# Patient Record
Sex: MALE | Race: WHITE | Employment: FULL TIME | URBAN - METROPOLITAN AREA
[De-identification: names, ages, dates, MRNs, and addresses within clinical notes are randomized per-mention and may not be internally consistent; named-entity substitution may affect disease eponyms.]

---

## 2017-01-25 ENCOUNTER — OFFICE VISIT (OUTPATIENT)
Dept: UROLOGY | Facility: CLINIC | Age: 37
End: 2017-01-25
Payer: COMMERCIAL

## 2017-01-25 VITALS — HEART RATE: 76 BPM | SYSTOLIC BLOOD PRESSURE: 123 MMHG | DIASTOLIC BLOOD PRESSURE: 76 MMHG

## 2017-01-25 DIAGNOSIS — N44.8 ACQUIRED ASYMMETRY IN TESTICULAR SIZE: Primary | ICD-10-CM

## 2017-01-25 PROCEDURE — 99203 OFFICE O/P NEW LOW 30 MIN: CPT | Performed by: UROLOGY

## 2017-01-25 ASSESSMENT — PAIN SCALES - GENERAL: PAINLEVEL: NO PAIN (0)

## 2017-01-25 NOTE — MR AVS SNAPSHOT
After Visit Summary   1/25/2017    Jacob Nails    MRN: [id number]           Patient Information     Date Of Birth          1980        Visit Information        Provider Department      1/25/2017 10:30 AM Saad Bowie MD [facility]        Today's Diagnoses     Acquired asymmetry in testicular size    -  1        Follow-ups after your visit        Your next 10 appointments already scheduled     Jan 26, 2017 11:20 AM   US TESTICULAR AND SCROTUM with MGUS1, MG US TECH   [facility] ([facility])    [id number] [place] [address] [phone]           Please bring a list of your medicines (including vitamins, minerals and over-the-counter drugs). Also, tell your doctor about any allergies you may have. Wear comfortable clothes and leave your valuables at home.  You do not need to do anything special to prepare for your exam.  Please call the Imaging Department at your exam site with any questions.              Future tests that were ordered for you today     Open Future Orders        Priority Expected Expires Ordered    US Testicular and Scrotum Routine  1/25/2018 1/25/2017            Who to contact     If you have questions or need follow up information about today's clinic visit or your schedule please contact [facility] directly at [phone].  Normal or non-critical lab and imaging results will be communicated to you by MyChart, letter or phone within 4 business days after the clinic has received the results. If you do not hear from us within 7 days, please contact the clinic through MyChart or phone. If you have a critical or abnormal lab result, we will notify you by phone as soon as possible.  Submit refill requests through [employer] or call your pharmacy and they will forward the refill request to us. Please allow 3 business days for your refill to be completed.          Additional  Information About Your Visit        [employer] Information     [employer] gives you secure access to your electronic health record. If you see a primary care provider, you can also send messages to your care team and make appointments. If you have questions, please call your primary care clinic.  If you do not have a primary care provider, please call [phone] and they will assist you.      [employer] is an electronic gateway that provides easy, online access to your medical records. With [employer], you can request a clinic appointment, read your test results, renew a prescription or communicate with your care team.     To access your existing account, please contact your [facility] or call [phone] for assistance.        Care EveryWhere ID     This is your Care EveryWhere ID. This could be used by other organizations to access your [place] medical records  [id number]        Your Vitals Were     Pulse                   76            Blood Pressure from Last 3 Encounters:   01/25/17 123/76   12/30/16 134/73   08/17/16 138/86    Weight from Last 3 Encounters:   12/30/16 73.029 kg (161 lb)   08/17/16 81.647 kg (180 lb)   12/31/15 87.998 kg (194 lb)               Primary Care Provider Office Phone # Fax #    [facility] [place] [phone]       No address on file        Thank you!     Thank you for choosing [facility]  for your care. Our goal is always to provide you with excellent care. Hearing back from our patients is one way we can continue to improve our services. Please take a few minutes to complete the written survey that you may receive in the mail after your visit with us. Thank you!             Your Updated Medication List - Protect others around you: Learn how to safely use, store and throw away your medicines at www.disposemymeds.org.      Notice  As of 1/25/2017 11:06 AM    You have not been prescribed any medications.

## 2017-01-25 NOTE — PROGRESS NOTES
I am seeing Jacob Nails in consultation from Rick Diaz  for evaluation of retractile right testis.    HPI:  Jacob Nails is a 36 year old male who notes ascension of the right testis with intercourse.  This is painful for him.  At rest testis sits in the lower scrotum, however.  No history of torsion.  The testis has always come down on its own.    REVIEW OF SYSTEMS:  General: negative  Skin: negative  Eyes: negative  Ears/Nose/Throat: negative  Respiratory: negative  Cardiovascular: negative  Gastrointestinal: negative  Genitourinary: see HPI  Musculoskeletal: negative  Neurologic: negative  Psychiatric: negative  Hematologic/Lymphatic/Immunologic: negative  Endocrine: negative    PAST MEDICAL HX:  No chronic medical problems     PAST SURG HX:  Shoulder surgery right shoulder fracture 1 year ago.    FAMILY HX:  Family History   Problem Relation Age of Onset     DIABETES Paternal Grandfather      CANCER Paternal Aunt      Hypertension No family hx of      CEREBROVASCULAR DISEASE No family hx of      Thyroid Disease No family hx of      Glaucoma No family hx of      Macular Degeneration No family hx of        SOCIAL HX:  Social History   Substance Use Topics     Smoking status: Never Smoker      Smokeless tobacco: Never Used      Comment: chew once per month     Alcohol Use: Yes      Comment: a few times per month       MEDICATIONS:  No prescription medications     ALLERGIES:  Milk protein extract and Wheat extract      GENERAL PHYSICAL EXAM:     /76 mmHg  Pulse 76   Constitutional: No acute distress. Well nourished.   PSYCH: normal mood and affect.  NEURO: normal gait, no focal deficits.   EYES: anicteric, EOMI, PERR.  CARDIOPULMONARY: breathing non-labored, pulse regular rate/rhythm, no peripheral edema.  GI: Abdomen soft, non-tender, no surgical scars, no organomegaly.  MUSCULOSKELETAL: normal limb proportions, no muscle wasting, no contractures.  SKIN: Normal virilized hair  distribution, no lesions, warts or rashes over genitalia, abdomen extremities or face.  HEME/LYMPH: no ecchymosis, no lymphadenopathy in groin or neck, no lymphedema.     EXAM:  Phallus normal , meatus adequate, no plaques palpated.   Left testis descended , size is normal  , consistency is normal . No intra-testicular masses.   Right testis descended , size is normal  , consistency is normal . No intra-testicular masses.   Epididymes present, non-tender, not-enlarged.   Left cord: Vas present. no varicocele noted.  Right cord: Vas present. no varicocele noted.     Prostate exam: not indicated.    Imaging/labs:  CR     0.84   1/20/2012    ASSESSMENT:     Retractile right testis.    PLAN:    Scrotal ultrasound to exclude testis pathology.    Discussed with him we could consider lysis of cremaster muscle on the right spermatic cord,  if problem bothersome enough over time. This would be a very easy case with minimal recovery.  The indication for this type of procedure would be when the pain is bothersome enough to have surgery.    PRN follow-up     Copied cc to Consulting provider Rick Diaz        Thank-you for the kind consultation.  Saad Bowie MD     Urological Surgeon    Scrotal ultrasound done 1/26/17  Impression:   1.  Right testicular microlithiasis.  No intratesticular mass.  In the  absence of risk factors, this alone is not an indication for  additional follow-up with ultrasound.   2.  Left epididymal head cyst versus spermatocele.

## 2017-01-25 NOTE — NURSING NOTE
"Jacob Nails's goals for this visit include:   Chief Complaint   Patient presents with     Consult     testicular pain - right      He requests these members of his care team be copied on today's visit information: YES -    Referring Provider:  Rick Diaz PA-C  [facility]  [address] [place] [address]    Initial /76 mmHg  Pulse 76 Estimated body mass index is 20.95 kg/(m^2) as calculated from the following:    Height as of 12/30/16: 1.867 m (6' 1.5\").    Weight as of 12/30/16: 73.029 kg (161 lb).  BP completed using cuff size: large  "

## 2017-01-26 ENCOUNTER — RADIANT APPOINTMENT (OUTPATIENT)
Dept: ULTRASOUND IMAGING | Facility: CLINIC | Age: 37
End: 2017-01-26
Attending: UROLOGY
Payer: COMMERCIAL

## 2017-01-26 DIAGNOSIS — N44.8 ACQUIRED ASYMMETRY IN TESTICULAR SIZE: ICD-10-CM

## 2017-01-26 PROCEDURE — 76870 US EXAM SCROTUM: CPT

## 2017-01-26 NOTE — PROGRESS NOTES
Quick Note:    Dear Jacob,   Here are your recent results.     Your right testis showed some microlithiasis- (tiny stones/calcifications) likely due to history of injury/trauma from the right testis being retractile. There may be a slightly increased risk of testis cancer in men with this finding but testicular self exam monthly is all that would be advised at this time.    There is no evidence of malignancy, good news.    Thank You  Let me know if you have any questions.      Saad Bowie MD  ______

## 2017-03-01 ENCOUNTER — OFFICE VISIT (OUTPATIENT)
Dept: UROLOGY | Facility: CLINIC | Age: 37
End: 2017-03-01
Payer: COMMERCIAL

## 2017-03-01 VITALS — DIASTOLIC BLOOD PRESSURE: 77 MMHG | SYSTOLIC BLOOD PRESSURE: 130 MMHG | HEART RATE: 66 BPM

## 2017-03-01 DIAGNOSIS — R39.198 DECREASED URINE STREAM: Primary | ICD-10-CM

## 2017-03-01 PROCEDURE — 52000 CYSTOURETHROSCOPY: CPT | Performed by: UROLOGY

## 2017-03-01 PROCEDURE — 99213 OFFICE O/P EST LOW 20 MIN: CPT | Mod: 25 | Performed by: UROLOGY

## 2017-03-01 RX ORDER — TAMSULOSIN HYDROCHLORIDE 0.4 MG/1
0.4 CAPSULE ORAL DAILY
Qty: 90 CAPSULE | Refills: 3 | Status: SHIPPED | OUTPATIENT
Start: 2017-03-01 | End: 2017-10-30

## 2017-03-01 ASSESSMENT — PAIN SCALES - GENERAL: PAINLEVEL: NO PAIN (0)

## 2017-03-01 NOTE — MR AVS SNAPSHOT
After Visit Summary   3/1/2017    Jacob Nails    MRN: [id number]           Patient Information     Date Of Birth          1980        Visit Information        Provider Department      3/1/2017 10:00 AM Saad Bowie MD [facility]        Care Instructions    After Your Cystoscopy    What happens after the exam?    You may go back to your normal diet and activity as you feel ready, unless your doctor tells you not to.    For the next two days, you may notice:    Some blood in your urine  Some burning when you urinate (use the toilet)  An urge to urinate more often  Bladder spasms    These are normal after the procedure and should go away after a day or two.  To relieve these problems drink 6 to 8 large glasses of water each day (includes drinks at meals) as this will help clear the urine.  Take warm baths to relieve pain and bladder spasms.  Do not add anything to the bath water.  You may also take Tylenol (acetaminophen) for pain if needed.    When should I call my doctor?    A fever over 100F (38C) for more than a day. (Before you call the doctor, check your temperature under your tongue)  Chills  Failure to urinate: No urine comes out when you try to use the toilet. (Try soaking in a bathtub full of warm water. If still no urine, call your doctor)  A lot of blood in the urine, or blood clots larger than a nickel  Pain in the back or belly area (abdomen)  Pain or spasms that are not relieved by warm tub baths and pain medicine  Severe pain, burning or other problems while passing urine  Pain that gets worse after two days                    Follow-ups after your visit        Your next 10 appointments already scheduled     Apr 05, 2017 10:00 AM CDT   Return Visit with Saad Bowie MD   [facility] ([facility]    [address] [place] [address] [phone]              Who to contact     If you have  questions or need follow up information about today's clinic visit or your schedule please contact [facility] directly at [phone].  Normal or non-critical lab and imaging results will be communicated to you by [employer], letter or phone within 4 business days after the clinic has received the results. If you do not hear from us within 7 days, please contact the clinic through [employer] or phone. If you have a critical or abnormal lab result, we will notify you by phone as soon as possible.  Submit refill requests through [employer] or call your pharmacy and they will forward the refill request to us. Please allow 3 business days for your refill to be completed.          Additional Information About Your Visit        [employer] Information     [employer] gives you secure access to your electronic health record. If you see a primary care provider, you can also send messages to your care team and make appointments. If you have questions, please call your primary care clinic.  If you do not have a primary care provider, please call [phone] and they will assist you.      [employer] is an electronic gateway that provides easy, online access to your medical records. With [employer], you can request a clinic appointment, read your test results, renew a prescription or communicate with your care team.     To access your existing account, please contact your [facility] or call [phone] for assistance.        Care EveryWhere ID     This is your Care EveryWhere ID. This could be used by other organizations to access your [place] medical records  [id number]        Your Vitals Were     Pulse                   66            Blood Pressure from Last 3 Encounters:   03/01/17 130/77   01/25/17 123/76   12/30/16 134/73    Weight from Last 3 Encounters:   12/30/16 73 kg (161 lb)   08/17/16 81.6 kg (180 lb)   12/31/15 88 kg (194 lb)              Today, you had the following     No orders  found for display       Primary Care Provider Office Phone # Fax #    Zenaida Morales [place] [phone]       No address on file        Thank you!     Thank you for choosing [facility]  for your care. Our goal is always to provide you with excellent care. Hearing back from our patients is one way we can continue to improve our services. Please take a few minutes to complete the written survey that you may receive in the mail after your visit with us. Thank you!             Your Updated Medication List - Protect others around you: Learn how to safely use, store and throw away your medicines at www.disposemymeds.org.      Notice  As of 3/1/2017 11:26 AM    You have not been prescribed any medications.

## 2017-03-01 NOTE — PROGRESS NOTES
Jacob Nails is a 36 year old male known to urology.  History of retractile testis that is not bothering him much now.    Noting that he has a weaker force of stream.  Sometimes can be strong.    Here for workup.    Uroflow March 1, 2017:  , Qmax 15.1, RU 38. Curve: flat bell.    Pre-procedure diagnosis:  Slow flow urine  Post-procedure diagnosis: Normal cystoscopy  Procedure performed:  Cystourethroscopy  Surgeon:    David HERNANDEZ   Anesthesia:    Local    Indications for procedure:   Jacob Nails is a 36 year old male with a history of slow urine flow.    Description of procedure:   After fully informed, voluntary consent was obtained, the patient was brought into the procedure room, identified and placed in a supine position on the cysto table.  The groin/scrotum/vagina/introitus were prepped and draped in a sterile fashion with betadine.  A 15F flexible cystoscope was inserted into the urethra and the bladder and urethra examined in a systematic manner.  The patient tolerated the procedure well and there were no complications.      Cystoscopic findings:  The urethra was normal without strictures.  The prostate was 2.5cm long and channel appeared open. The bladder neck was not narrowed but was perhaps a little narrow.  There was no median lobe.  The bladder was completely surveyed.  There was no trabeculation.  There were no tumors, stones, or diverticula identifed.  The ureteric orifices were normal in position and number and effluxing clear urine.    Assessment/Plan:   Jacob Nails is a 36 year old male with a history of slow flow, now with pretty normal findings on cystoscopy.  Differential diagnosis is Bruno's syndrome versus primary bladder neck dysfunction.  No suspicion of weak detrusor.    Will try Flomax.  Discussed side effects and how to take.  If flow / symptoms not improved on Flomax, then schedule urodynamic studies.  Return to clinic 6 weeks  15min visit, over 50%  face to face in counseling/discussion of above voiding issues.    David HERNANDEZ

## 2017-03-01 NOTE — NURSING NOTE
"Jacob Nails's goals for this visit include:   Chief Complaint   Patient presents with     RECHECK     He requests these members of his care team be copied on today's visit information: NONE    Initial /77  Pulse 66 Estimated body mass index is 20.95 kg/(m^2) as calculated from the following:    Height as of 12/30/16: 1.867 m (6' 1.5\").    Weight as of 12/30/16: 73 kg (161 lb).  BP completed using cuff size: regular    "

## 2017-03-01 NOTE — PATIENT INSTRUCTIONS
After Your Cystoscopy    What happens after the exam?    You may go back to your normal diet and activity as you feel ready, unless your doctor tells you not to.    For the next two days, you may notice:    Some blood in your urine  Some burning when you urinate (use the toilet)  An urge to urinate more often  Bladder spasms    These are normal after the procedure and should go away after a day or two.  To relieve these problems drink 6 to 8 large glasses of water each day (includes drinks at meals) as this will help clear the urine.  Take warm baths to relieve pain and bladder spasms.  Do not add anything to the bath water.  You may also take Tylenol (acetaminophen) for pain if needed.    When should I call my doctor?    A fever over 100F (38C) for more than a day. (Before you call the doctor, check your temperature under your tongue)  Chills  Failure to urinate: No urine comes out when you try to use the toilet. (Try soaking in a bathtub full of warm water. If still no urine, call your doctor)  A lot of blood in the urine, or blood clots larger than a nickel  Pain in the back or belly area (abdomen)  Pain or spasms that are not relieved by warm tub baths and pain medicine  Severe pain, burning or other problems while passing urine  Pain that gets worse after two days

## 2017-09-29 ENCOUNTER — TRANSFERRED RECORDS (OUTPATIENT)
Dept: PHYSICAL THERAPY | Facility: CLINIC | Age: 37
End: 2017-09-29

## 2017-10-30 ENCOUNTER — OFFICE VISIT (OUTPATIENT)
Dept: FAMILY MEDICINE | Facility: CLINIC | Age: 37
End: 2017-10-30
Payer: COMMERCIAL

## 2017-10-30 ENCOUNTER — TELEPHONE (OUTPATIENT)
Dept: FAMILY MEDICINE | Facility: CLINIC | Age: 37
End: 2017-10-30

## 2017-10-30 VITALS
DIASTOLIC BLOOD PRESSURE: 66 MMHG | HEART RATE: 61 BPM | BODY MASS INDEX: 19.91 KG/M2 | WEIGHT: 153 LBS | OXYGEN SATURATION: 100 % | SYSTOLIC BLOOD PRESSURE: 120 MMHG | TEMPERATURE: 97.6 F

## 2017-10-30 DIAGNOSIS — J06.9 UPPER RESPIRATORY TRACT INFECTION, UNSPECIFIED TYPE: ICD-10-CM

## 2017-10-30 DIAGNOSIS — J30.2 SEASONAL ALLERGIC RHINITIS, UNSPECIFIED CHRONICITY, UNSPECIFIED TRIGGER: Primary | ICD-10-CM

## 2017-10-30 PROCEDURE — 99213 OFFICE O/P EST LOW 20 MIN: CPT | Performed by: HOSPITALIST

## 2017-10-30 RX ORDER — ALBUTEROL SULFATE 90 UG/1
2 AEROSOL, METERED RESPIRATORY (INHALATION) EVERY 6 HOURS PRN
Qty: 1 INHALER | Refills: 0 | Status: SHIPPED | OUTPATIENT
Start: 2017-10-30

## 2017-10-30 RX ORDER — LORATADINE 10 MG/1
10 TABLET ORAL DAILY
Qty: 30 TABLET | Refills: 0 | Status: SHIPPED | OUTPATIENT
Start: 2017-10-30

## 2017-10-30 RX ORDER — AZITHROMYCIN 250 MG/1
TABLET, FILM COATED ORAL
Qty: 6 TABLET | Refills: 0 | Status: SHIPPED | OUTPATIENT
Start: 2017-10-30

## 2017-10-30 NOTE — NURSING NOTE
"Chief Complaint   Patient presents with     Cough       Initial /66  Pulse 61  Temp 97.6  F (36.4  C) (Oral)  Wt 153 lb (69.4 kg)  SpO2 100%  BMI 19.91 kg/m2 Estimated body mass index is 19.91 kg/(m^2) as calculated from the following:    Height as of 12/30/16: 6' 1.5\" (1.867 m).    Weight as of this encounter: 153 lb (69.4 kg).  Medication Reconciliation: complete  Mar Claudio CMA    "

## 2017-10-30 NOTE — MR AVS SNAPSHOT
After Visit Summary   10/30/2017    Jacob Nails    MRN: [id number]           Patient Information     Date Of Birth          1980        Visit Information        Provider Department      10/30/2017 12:40 PM Hector Guerra MD [facility]        Today's Diagnoses     Seasonal allergic rhinitis, unspecified chronicity, unspecified trigger    -  1    Upper respiratory tract infection, unspecified type           Follow-ups after your visit        Who to contact     If you have questions or need follow up information about today's clinic visit or your schedule please contact [facility] directly at [phone].  Normal or non-critical lab and imaging results will be communicated to you by MyChart, letter or phone within 4 business days after the clinic has received the results. If you do not hear from us within 7 days, please contact the clinic through [employer] or phone. If you have a critical or abnormal lab result, we will notify you by phone as soon as possible.  Submit refill requests through [employer] or call your pharmacy and they will forward the refill request to us. Please allow 3 business days for your refill to be completed.          Additional Information About Your Visit        MyChart Information     [employer] gives you secure access to your electronic health record. If you see a primary care provider, you can also send messages to your care team and make appointments. If you have questions, please call your primary care clinic.  If you do not have a primary care provider, please call [phone] and they will assist you.        Care EveryWhere ID     This is your Care EveryWhere ID. This could be used by other organizations to access your [place] medical records  [id number]        Your Vitals Were     Pulse Temperature Pulse Oximetry BMI (Body Mass Index)          61 97.6  F (36.4  C) (Oral) 100% 19.91 kg/m2         Blood Pressure from Last 3  Encounters:   10/30/17 120/66   03/01/17 130/77   01/25/17 123/76    Weight from Last 3 Encounters:   10/30/17 153 lb (69.4 kg)   12/30/16 161 lb (73 kg)   08/17/16 180 lb (81.6 kg)              Today, you had the following     No orders found for display         Today's Medication Changes          These changes are accurate as of: 10/30/17  1:34 PM.  If you have any questions, ask your nurse or doctor.               Start taking these medicines.        Dose/Directions    albuterol 108 (90 BASE) MCG/ACT Inhaler   Commonly known as:  PROAIR HFA/PROVENTIL HFA/VENTOLIN HFA   Used for:  Seasonal allergic rhinitis, unspecified chronicity, unspecified trigger   Started by:  Hector Guerra MD        Dose:  2 puff   Inhale 2 puffs into the lungs every 6 hours as needed for shortness of breath / dyspnea or wheezing   Quantity:  1 Inhaler   Refills:  0       azithromycin 250 MG tablet   Commonly known as:  ZITHROMAX   Used for:  Upper respiratory tract infection, unspecified type   Started by:  Hector Guerra MD        Two tablets first day, then one tablet daily for four days.   Quantity:  6 tablet   Refills:  0       loratadine 10 MG tablet   Commonly known as:  CLARITIN   Used for:  Seasonal allergic rhinitis, unspecified chronicity, unspecified trigger   Started by:  Hector Guerra MD        Dose:  10 mg   Take 1 tablet (10 mg) by mouth daily   Quantity:  30 tablet   Refills:  0            Where to get your medicines      These medications were sent to [address], [address] [address]     Phone:  [phone]     albuterol 108 (90 BASE) MCG/ACT Inhaler    azithromycin 250 MG tablet    loratadine 10 MG tablet                Primary Care Provider Office Phone # Fax #    [facility] [phone]       No address on file        Equal Access to Services     DARRION ALMAZAN AH: Lloyd swanson  Alex, wareneeda luqadaha, qachandrikata kabetsey solitario, lala pabon yonkaden lasandyru abel. So [facility] [phone].    ATENCIÓN: Si dawna navarro, tiene a rizzo disposición servicios gratuitos de asistencia lingüística. Marielos al [phone].    We comply with applicable federal civil rights laws and [place] laws. We do not discriminate on the basis of race, color, national origin, age, disability, sex, sexual orientation, or gender identity.            Thank you!     Thank you for choosing [facility]  for your care. Our goal is always to provide you with excellent care. Hearing back from our patients is one way we can continue to improve our services. Please take a few minutes to complete the written survey that you may receive in the mail after your visit with us. Thank you!             Your Updated Medication List - Protect others around you: Learn how to safely use, store and throw away your medicines at www.disposemymeds.org.          This list is accurate as of: 10/30/17  1:34 PM.  Always use your most recent med list.                   Brand Name Dispense Instructions for use Diagnosis    albuterol 108 (90 BASE) MCG/ACT Inhaler    PROAIR HFA/PROVENTIL HFA/VENTOLIN HFA    1 Inhaler    Inhale 2 puffs into the lungs every 6 hours as needed for shortness of breath / dyspnea or wheezing    Seasonal allergic rhinitis, unspecified chronicity, unspecified trigger       azithromycin 250 MG tablet    ZITHROMAX    6 tablet    Two tablets first day, then one tablet daily for four days.    Upper respiratory tract infection, unspecified type       loratadine 10 MG tablet    CLARITIN    30 tablet    Take 1 tablet (10 mg) by mouth daily    Seasonal allergic rhinitis, unspecified chronicity, unspecified trigger

## 2017-10-30 NOTE — TELEPHONE ENCOUNTER
"Reason for appointment today is listed as \"throat closing\".  RN was asked to call patient to triage.  Called patient and first got busy signal.    Called again later and got recording that mailbox was full.    Called a few minutes later and reached patient.   He states he has had a chest cold x 1 month with congestion and coughing.   Has had a few episodes where he wakes up in the middle of the night and feels like he is \"gasping for air\".  He states he can breath out but has a hard time breathing air back in. These last about 30 seconds - 1 minute.   This happened again last night and thought he should call for appointment.     Patient denies any shortness of breath, chest pain, or dizziness currently.  Patient has 3:40 appointment this afternoon so we moved his appointment up to 12:40 today instead.     Informed him to to go to ER if he gets shortness of breath or chest pain or if symptoms worsen. He states he understands this.    Renee Fernandes RN    "

## 2017-10-30 NOTE — PROGRESS NOTES
SUBJECTIVE:   Jacob Nails is a 37 year old male who presents to clinic today for the following health issues:      Throat issues      Duration: 2 week off and on     Description  Feels like he is unable to breath in  - last for about 30 seconds     Severity: moderate    Accompanying signs and symptoms: chest congestion, cough x 1.5 months     History (predisposing factors):  none    Precipitating or alleviating factors: None    Therapies tried and outcome:  none      Pt come for shortness of breath and sinus pressure. No fever but every now and then feel shortness of breath. No chest pain. Has wheezing every now and then.     Allergies   Allergen Reactions     Milk Protein Extract      Wheat Extract Diarrhea       History reviewed. No pertinent past medical history.      No current outpatient prescriptions on file prior to visit.  No current facility-administered medications on file prior to visit.     Social History   Substance Use Topics     Smoking status: Never Smoker     Smokeless tobacco: Never Used      Comment: chew once per month     Alcohol use Yes      Comment: a few times per month       ROS:  Consitutional: As above  ENT: As above  Respiratory: As above    OBJECTIVE:  /66  Pulse 61  Temp 97.6  F (36.4  C) (Oral)  Wt 153 lb (69.4 kg)  SpO2 100%  BMI 19.91 kg/m2  GENERAL APPEARANCE: healthy, alert and moderate distress  EYES: conjunctiva clear  EARS:no cerumen.   Ear canals no erythema, TM's intact no erythema .    NOSE/MOUTH: Nose and mouth is normal, no erythema or lesions  THROAT: no erythema w/ no tonsillar enlargement . positive exudates  NECK: supple, nontender, no lymphadenopathy  RESP: lungs show some wheezing, minimal crackles  CV: regular rates and rhythm, normal S1 S2, no murmur noted  NEURO: awake, alert        No results found for this or any previous visit (from the past 168 hour(s)).     ASSESSMENT:     ICD-10-CM    1. Seasonal allergic rhinitis, unspecified  chronicity, unspecified trigger J30.2 loratadine (CLARITIN) 10 MG tablet     albuterol (PROAIR HFA/PROVENTIL HFA/VENTOLIN HFA) 108 (90 BASE) MCG/ACT Inhaler   2. Upper respiratory tract infection, unspecified type J06.9 azithromycin (ZITHROMAX) 250 MG tablet         PLAN:    Will put on azithromycin. Will give albuterol inhaler. Will also give claritin to be taken daily. Tylenol and ibuprofen prn for pain  Lots of rest and fluids.  Follow up in 1-2 weeks if not better or sooner if getting worse .    Hector Guerra MD

## 2018-11-29 ENCOUNTER — THERAPY VISIT (OUTPATIENT)
Dept: PHYSICAL THERAPY | Facility: CLINIC | Age: 38
End: 2018-11-29
Payer: COMMERCIAL

## 2018-11-29 DIAGNOSIS — M25.511 RIGHT SHOULDER PAIN: Primary | ICD-10-CM

## 2018-11-29 PROCEDURE — 97112 NEUROMUSCULAR REEDUCATION: CPT | Mod: GP | Performed by: PHYSICAL THERAPIST

## 2018-11-29 PROCEDURE — 97161 PT EVAL LOW COMPLEX 20 MIN: CPT | Mod: GP | Performed by: PHYSICAL THERAPIST

## 2018-11-29 PROCEDURE — 97110 THERAPEUTIC EXERCISES: CPT | Mod: GP | Performed by: PHYSICAL THERAPIST

## 2018-11-29 NOTE — PROGRESS NOTES
[facility] Initial Evaluation  Subjective:  Patient is a 38 year old male presenting with rehab right shoulder hpi.   Jacob Nails is a 38 year old male with a right shoulder condition.  Condition occurred with:  A fall.  Condition occurred: at home.  This is a new and recurrent condition  Pt fractured glenoid in R shoulder [id number] and then had some complications with fixation screw in 2017 and had successful rehab, recently saw MD for recurrent R shoulder pain and ordered PT on 11-20-18..    Patient reports pain:  Anterior, lateral and scapular area.  Radiates to: none.  Pain is described as aching and is intermittent and reported as 2/10.  Associated symptoms:  Loss of strength. Pain is worse during the day.  Symptoms are exacerbated by certain positions and relieved by activity/movement.  Since onset symptoms are unchanged.  Special testing: none.  Previous treatment includes physical therapy.  There was significant improvement following previous treatment.  General health as reported by patient is good.  Pertinent medical history includes:  None.  Medical allergies: no.  Other surgeries include:  Orthopedic surgery (R shoulder).  Current medications:  None as reported by the patient.  Current occupation is   .  Patient is working in normal job without restrictions.  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    Barriers include:  None as reported by the patient.    Red flags:  None as reported by the patient.                        Objective:        SHOULDER:    Cervical Screen: normal and painfree    Shoulder:   PROM L PROM R AROM L AROM R MMT L MMT R   Flex   165 165 5/5 5/5   Abd   175 175 5/5 5/5   Full Can     5/5 4/5   Empty Can     5/5 4/5   IR   T5 T5 5/5 5/5   ER   90 87 5/5 4/5   Ext/IR         MMT infraspinatus and teres minor on R 4/5 vs 5/5 on L    Scapulothoraic Rhythm: increased upward rotation on R, slight resting elevation and  protraction    Palpation: mild TTP R essence-lateral shoulder    Special tests:   L R   Impingement     Neer's + +   Hawkin's-Jeremie neg +   Coracoid Impingement     Internal impingement     Labral     Anterior Slide neg neg   [place]'s     Crank     Instability     Apprehension (anterior) neg neg   Relocation (anterior)     Anterior Load & Shift     Posterior Load & Shift     Posterior instability (with 90 degrees flex)     Multi-Directional Instability      Sulcus     Biceps      Speed's neg nbeg   Rotator Cuff Tear     Drop Arm neg neg   Belly Press     Lift off  neg +     GH Mobility  L  R   Posterior glide limited limited   Inferior glide wnl wnl   Anterior glide wnl wnl               System    Physical Exam    General     ROS    Assessment/Plan:    Patient is a 38 year old male with right side shoulder complaints.    Patient has the following significant findings with corresponding treatment plan.                Diagnosis 1:  R shoulder pain  Pain -  hot/cold therapy, US, electric stimulation, manual therapy, splint/taping/bracing/orthotics, education and home program  Decreased joint mobility - manual therapy, therapeutic exercise, therapeutic activity and home program  Decreased strength - therapeutic exercise, therapeutic activities and home program  Impaired posture - neuro re-education, therapeutic activities and home program    Therapy Evaluation Codes:   1) History comprised of:   Personal factors that impact the plan of care:      None.    Comorbidity factors that impact the plan of care are:      None.     Medications impacting care: None.  2) Examination of Body Systems comprised of:   Body structures and functions that impact the plan of care:      Shoulder.   Activity limitations that impact the plan of care are:      Lifting and reaching.  3) Clinical presentation characteristics are:   Stable/Uncomplicated.  4) Decision-Making    Low complexity using standardized patient assessment instrument  and/or measureable assessment of functional outcome.  Cumulative Therapy Evaluation is: Low complexity.    Previous and current functional limitations:  (See Goal Flow Sheet for this information)    Short term and Long term goals: (See Goal Flow Sheet for this information)     Communication ability:  Patient appears to be able to clearly communicate and understand verbal and written communication and follow directions correctly.  Treatment Explanation - The following has been discussed with the patient:   RX ordered/plan of care  Anticipated outcomes  Possible risks and side effects  This patient would benefit from PT intervention to resume normal activities.   Rehab potential is good.    Frequency:  2 X a month, once daily  Duration:  for 6 months, 12 total visits.  Discharge Plan:  Achieve all LTG.  Independent in home treatment program.  Reach maximal therapeutic benefit.    Please refer to the daily flowsheet for treatment today, total treatment time and time spent performing 1:1 timed codes.

## 2018-11-29 NOTE — MR AVS SNAPSHOT
After Visit Summary   11/29/2018    Jacob Nails    MRN: [id number]           Patient Information     Date Of Birth          1980        Visit Information        Provider Department      11/29/2018 9:40 AM Trevor Malone, PT [facility]        Today's Diagnoses     Right shoulder pain    -  1       Follow-ups after your visit        Your next 10 appointments already scheduled     Dec 13, 2018  9:40 AM CST   JENNIFER Extremity with Trevor Malone, PT   [facility] ([facility] [place])    [address]  [place] [id number] [phone]            Dec 27, 2018  9:40 AM CST   JENNIFER Extremity with Trevor Malone, PT   [facility] ([facility] [place]    [address]  [place] [id number] [phone]              Who to contact     If you have questions or need follow up information about today's clinic visit or your schedule please contact [facility] directly at [phone].  Normal or non-critical lab and imaging results will be communicated to you by [employer], letter or phone within 4 business days after the clinic has received the results. If you do not hear from us within 7 days, please contact the clinic through [employer] or phone. If you have a critical or abnormal lab result, we will notify you by phone as soon as possible.  Submit refill requests through [employer] or call your pharmacy and they will forward the refill request to us. Please allow 3 business days for your refill to be completed.          Additional Information About Your Visit        [employer] Information     [employer] gives you secure access to your electronic health record. If you see a primary care provider, you can also send messages to your care team and make appointments. If you have questions, please call your primary care clinic.  If you do not have a primary care provider, please call [phone] and they will assist you.        Care EveryWhere ID     This is  your Care EveryWhere ID. This could be used by other organizations to access your [facility] medical records  [id number]         Blood Pressure from Last 3 Encounters:   10/30/17 120/66   03/01/17 130/77   01/25/17 123/76    Weight from Last 3 Encounters:   10/30/17 69.4 kg (153 lb)   12/30/16 73 kg (161 lb)   08/17/16 81.6 kg (180 lb)              We Performed the Following     HC PT EVAL, LOW COMPLEXITY     [facility] INITIAL EVAL REPORT     NEUROMUSCULAR RE-EDUCATION     THERAPEUTIC EXERCISES        Primary Care Provider Office Phone # Fax #    [facility] [phone]       No address on file        Equal Access to Services     DARRION ALMAZAN : [place] Johnson, wahermilo carter, qaybta kaalmada kassi, lala villanueva . So [facility] [phone].    ATENCIÓN: Si habla español, tiene a rizzo disposición servicios gratuitos de asistencia lingüística. Llame al [phone].    We comply with applicable federal civil rights laws and [place] laws. We do not discriminate on the basis of race, color, national origin, age, disability, sex, sexual orientation, or gender identity.            Thank you!     Thank you for choosing [facility]  for your care. Our goal is always to provide you with excellent care. Hearing back from our patients is one way we can continue to improve our services. Please take a few minutes to complete the written survey that you may receive in the mail after your visit with us. Thank you!             Your Updated Medication List - Protect others around you: Learn how to safely use, store and throw away your medicines at www.disposemymeds.org.          This list is accurate as of 11/29/18 11:08 AM.  Always use your most recent med list.                   Brand Name Dispense Instructions for use Diagnosis    albuterol 108 (90 Base) MCG/ACT inhaler    PROAIR HFA/PROVENTIL HFA/VENTOLIN HFA    1 Inhaler    Inhale 2 puffs into the lungs  every 6 hours as needed for shortness of breath / dyspnea or wheezing    Seasonal allergic rhinitis, unspecified chronicity, unspecified trigger       azithromycin 250 MG tablet    ZITHROMAX    6 tablet    Two tablets first day, then one tablet daily for four days.    Upper respiratory tract infection, unspecified type       loratadine 10 MG tablet    CLARITIN    30 tablet    Take 1 tablet (10 mg) by mouth daily    Seasonal allergic rhinitis, unspecified chronicity, unspecified trigger

## 2018-12-27 ENCOUNTER — THERAPY VISIT (OUTPATIENT)
Dept: PHYSICAL THERAPY | Facility: CLINIC | Age: 38
End: 2018-12-27
Payer: COMMERCIAL

## 2018-12-27 DIAGNOSIS — M25.511 RIGHT SHOULDER PAIN: ICD-10-CM

## 2018-12-27 PROCEDURE — 97112 NEUROMUSCULAR REEDUCATION: CPT | Mod: GP | Performed by: PHYSICAL THERAPIST

## 2018-12-27 PROCEDURE — 97110 THERAPEUTIC EXERCISES: CPT | Mod: GP | Performed by: PHYSICAL THERAPIST

## 2019-01-10 ENCOUNTER — THERAPY VISIT (OUTPATIENT)
Dept: PHYSICAL THERAPY | Facility: CLINIC | Age: 39
End: 2019-01-10
Payer: COMMERCIAL

## 2019-01-10 DIAGNOSIS — M25.511 RIGHT SHOULDER PAIN: ICD-10-CM

## 2019-01-10 PROCEDURE — 97110 THERAPEUTIC EXERCISES: CPT | Mod: GP | Performed by: PHYSICAL THERAPIST

## 2019-01-24 ENCOUNTER — THERAPY VISIT (OUTPATIENT)
Dept: PHYSICAL THERAPY | Facility: CLINIC | Age: 39
End: 2019-01-24
Payer: COMMERCIAL

## 2019-01-24 DIAGNOSIS — M25.511 RIGHT SHOULDER PAIN: ICD-10-CM

## 2019-01-24 PROCEDURE — 97112 NEUROMUSCULAR REEDUCATION: CPT | Mod: GP | Performed by: PHYSICAL THERAPIST

## 2019-01-24 PROCEDURE — 97110 THERAPEUTIC EXERCISES: CPT | Mod: GP | Performed by: PHYSICAL THERAPIST

## 2019-01-24 NOTE — PROGRESS NOTES
Subjective:  HPI                    Objective:  System    Physical Exam    General     ROS    Assessment/Plan:    PROGRESS  REPORT    Progress reporting period is from 11/29/18 to 1/24/19.     SUBJECTIVE  Subjective: pt reports his shoulder is feeling good, stronger, stable and the best it has felt in the past few years. also admits he is moving his arm better than he has in years, he previously would protect/guard his arm movements when reaching up or out and now does not need to perform those aberrant movements.   Current Pain level: 0/10   Initial Pain level: 2/10   Changes in function: Yes, see goal flow sheet for change in function   Adverse reactions: None;   ,     The objective findings are from DOS 1/24/19.    OBJECTIVE  Objective: full and painfree AROM all planes, MMT normal 5/5 BUE      ASSESSMENT/PLAN  Updated problem list and treatment plan: Diagnosis 1:  R shoulder pain  STG/LTGs have been met or progress has been made towards goals:  Yes, painfree and full range reaching.  Assessment of Progress: The patient's condition is improving.  The patient's condition has potential to improve.  Self Management Plans:  Patient has been instructed in a home treatment program.  Jacob continues to require the following intervention to meet STG and LTG's: PT    Recommendations:  This patient would benefit from continued therapy.     Frequency:  1 X a month, once daily  Duration:  for 1 months, 1 more visit in a month and pending overall feel and function of arm in a month we may discharge PT services.        Please refer to the daily flowsheet for treatment today, total treatment time and time spent performing 1:1 timed codes.

## 2019-02-21 ENCOUNTER — THERAPY VISIT (OUTPATIENT)
Dept: PHYSICAL THERAPY | Facility: CLINIC | Age: 39
End: 2019-02-21
Payer: COMMERCIAL

## 2019-02-21 DIAGNOSIS — M25.511 RIGHT SHOULDER PAIN: ICD-10-CM

## 2019-02-21 PROCEDURE — 97112 NEUROMUSCULAR REEDUCATION: CPT | Mod: GP | Performed by: PHYSICAL THERAPIST

## 2019-02-21 PROCEDURE — 97110 THERAPEUTIC EXERCISES: CPT | Mod: GP | Performed by: PHYSICAL THERAPIST

## 2019-02-21 NOTE — PROGRESS NOTES
Subjective:  HPI                    Objective:  System    Physical Exam    General     ROS    Assessment/Plan:    PROGRESS  REPORT    Progress reporting period is from 1/24/19 to 2/21/19.     SUBJECTIVE  Subjective: pt reports his shoulder has been more sore recently. he notices he doesn't always hold it in good position/posture as much recently and has been avoiding using it at times. admits   Current Pain level: 2/10   Initial Pain level: 2/10   Changes in function: No changes noted in function since last SOAP note   Adverse reactions: None;   ,     The objective findings are from DOS 2/21/19.    OBJECTIVE  Objective: full and painfree AROM, normal BUE gross strength 5/5 all planes, poor posture with R rounded shoulder      ASSESSMENT/PLAN  Updated problem list and treatment plan: Diagnosis 1:  R shoulder pain  STG/LTGs have been met or progress has been made towards goals:  Yes, progressing painfree reaching and new goals of lifting/carrying painfree.  Assessment of Progress: The patient's condition is improving.  The patient's condition has potential to improve.  Self Management Plans:  Patient is independent in a home treatment program.  Jacob continues to require the following intervention to meet STG and LTG's: PT    Recommendations:  [place] has been seen clinically for 5 visits and has been feeling really good until this past few weeks. Pt presented to clinic today with increased pain and poor posture awareness of R shoulder. He admits he has not been consistent with exercises, performing exercises 1x/wk, and overall the shoulder has been hurting more since decreasing consistency. He also admits his R shoulder feels better after performing exercises and that his L shoulder has started to hurt, overall his L shoulder is feeling worse than his R shoulder. [place] would benefit from continued PT services to progress shoulder strengthening/stability program, posture awareness, encourage consistency with home  exercise program. We currently have 7 remaining PT visits from original order and plan to continued program 1x every 2-3weeks once daily for 7 visits.    Please refer to the daily flowsheet for treatment today, total treatment time and time spent performing 1:1 timed codes.

## 2019-03-14 ENCOUNTER — THERAPY VISIT (OUTPATIENT)
Dept: PHYSICAL THERAPY | Facility: CLINIC | Age: 39
End: 2019-03-14
Payer: COMMERCIAL

## 2019-03-14 DIAGNOSIS — M25.511 RIGHT SHOULDER PAIN: ICD-10-CM

## 2019-03-14 PROCEDURE — 97112 NEUROMUSCULAR REEDUCATION: CPT | Mod: GP | Performed by: PHYSICAL THERAPIST

## 2019-03-14 PROCEDURE — 97110 THERAPEUTIC EXERCISES: CPT | Mod: GP | Performed by: PHYSICAL THERAPIST

## 2019-03-14 NOTE — LETTER
[facility]  [address] [place] [address]  [phone]    March 15, 2019    Re: Jacob Nails   :   1980  MRN:  [id number]   REFERRING PHYSICIAN:   [facility]    Date of Initial Evaluation:  18  Visits:  Rxs Used: 6  Reason for Referral:  Right shoulder pain    PROGRESS  REPORT  Progress reporting period is from 19 to 3/14/19.     SUBJECTIVE  Subjective: pt reports he has been more consistent with exercises after setting timer for 8pm each night, continues to overall feel good but has continued discomfort intermittently and poor posture.   Current Pain level: 0/10   Initial Pain level: 2/10   Changes in function: Yes, see goal flow sheet for change in function   Adverse reactions: None  The objective findings are from DOS 3/14/19.    OBJECTIVE  Objective: full and painfree AROM, normal 5/5 strength all planes BUE, slight supero-anteriorly positioned R GH joint and scapula. advanced strengthening.      ASSESSMENT/PLAN  Updated problem list and treatment plan: Diagnosis 1:  R shoulder pain  STG/LTGs have been met or progress has been made towards goals:  Yes, progressing towards painfree lifting/carrying.  Assessment of Progress: The patient's condition is improving.  The patient's condition has potential to improve.  Self Management Plans:  Patient is independent in a home treatment program.  I have re-evaluated this patient and find that the nature, scope, duration and intensity of the therapy is appropriate for the medical condition of the patient.  Jacob continues to require the following intervention to meet STG and LTG's: PT    Recommendations:  This patient would benefit from continued therapy.     Frequency:  1 X a month, once daily  Duration:  for 4-6 months    Thank you for your referral.    INQUIRIES  Therapist: [facility]  [address]  [place] [address]  Phone:  [phone]  Fax: [phone]

## 2019-03-14 NOTE — PROGRESS NOTES
Subjective:  HPI                    Objective:  System    Physical Exam    General     ROS    Assessment/Plan:    PROGRESS  REPORT    Progress reporting period is from 1/24/19 to 3/14/19.     SUBJECTIVE  Subjective: pt reports he has been more consistent with exercises after setting timer for 8pm each night, continues to overall feel good but has continued discomfort intermittently and poor posture.   Current Pain level: 0/10   Initial Pain level: 2/10   Changes in function: Yes, see goal flow sheet for change in function   Adverse reactions: None;   ,     The objective findings are from DOS 3/14/19.    OBJECTIVE  Objective: full and painfree AROM, normal 5/5 strength all planes BUE, slight supero-anteriorly positioned R GH joint and scapula. advanced strengthening.      ASSESSMENT/PLAN  Updated problem list and treatment plan: Diagnosis 1:  R shoulder pain  STG/LTGs have been met or progress has been made towards goals:  Yes, progressing towards painfree lifting/carrying.  Assessment of Progress: The patient's condition is improving.  The patient's condition has potential to improve.  Self Management Plans:  Patient is independent in a home treatment program.  I have re-evaluated this patient and find that the nature, scope, duration and intensity of the therapy is appropriate for the medical condition of the patient.  Jacob continues to require the following intervention to meet STG and LTG's: PT    Recommendations:  This patient would benefit from continued therapy.     Frequency:  1 X a month, once daily  Duration:  for 4-6 months        Please refer to the daily flowsheet for treatment today, total treatment time and time spent performing 1:1 timed codes.

## 2019-06-10 PROBLEM — M25.511 RIGHT SHOULDER PAIN: Status: RESOLVED | Noted: 2018-11-29 | Resolved: 2019-06-10

## 2019-06-10 NOTE — PROGRESS NOTES
Pt has not returned to clinic since previous progress note, current status is unknown and plan to discharge PT services.

## 2019-10-01 ENCOUNTER — HEALTH MAINTENANCE LETTER (OUTPATIENT)
Age: 39
End: 2019-10-01

## 2021-01-15 ENCOUNTER — HEALTH MAINTENANCE LETTER (OUTPATIENT)
Age: 41
End: 2021-01-15

## 2021-09-04 ENCOUNTER — HEALTH MAINTENANCE LETTER (OUTPATIENT)
Age: 41
End: 2021-09-04

## 2022-02-19 ENCOUNTER — HEALTH MAINTENANCE LETTER (OUTPATIENT)
Age: 42
End: 2022-02-19

## 2022-10-16 ENCOUNTER — HEALTH MAINTENANCE LETTER (OUTPATIENT)
Age: 42
End: 2022-10-16

## 2023-04-01 ENCOUNTER — HEALTH MAINTENANCE LETTER (OUTPATIENT)
Age: 43
End: 2023-04-01